# Patient Record
Sex: MALE | Race: OTHER | Employment: OTHER | URBAN - METROPOLITAN AREA
[De-identification: names, ages, dates, MRNs, and addresses within clinical notes are randomized per-mention and may not be internally consistent; named-entity substitution may affect disease eponyms.]

---

## 2024-04-15 ENCOUNTER — OFFICE VISIT (OUTPATIENT)
Age: 54
End: 2024-04-15
Payer: COMMERCIAL

## 2024-04-15 ENCOUNTER — TELEPHONE (OUTPATIENT)
Dept: ADMINISTRATIVE | Facility: OTHER | Age: 54
End: 2024-04-15

## 2024-04-15 VITALS
OXYGEN SATURATION: 95 % | DIASTOLIC BLOOD PRESSURE: 82 MMHG | SYSTOLIC BLOOD PRESSURE: 125 MMHG | BODY MASS INDEX: 17.89 KG/M2 | HEIGHT: 74 IN | WEIGHT: 139.4 LBS | HEART RATE: 88 BPM

## 2024-04-15 DIAGNOSIS — R73.01 IMPAIRED FASTING GLUCOSE: ICD-10-CM

## 2024-04-15 DIAGNOSIS — I10 ESSENTIAL HYPERTENSION: ICD-10-CM

## 2024-04-15 DIAGNOSIS — Z11.4 SCREENING FOR HIV (HUMAN IMMUNODEFICIENCY VIRUS): ICD-10-CM

## 2024-04-15 DIAGNOSIS — I48.0 PAROXYSMAL ATRIAL FIBRILLATION (HCC): ICD-10-CM

## 2024-04-15 DIAGNOSIS — H93.13 TINNITUS OF BOTH EARS: ICD-10-CM

## 2024-04-15 DIAGNOSIS — Z11.59 NEED FOR HEPATITIS C SCREENING TEST: Primary | ICD-10-CM

## 2024-04-15 DIAGNOSIS — M17.0 PRIMARY OSTEOARTHRITIS OF BOTH KNEES: ICD-10-CM

## 2024-04-15 PROBLEM — N28.1 RENAL CYST: Status: ACTIVE | Noted: 2023-11-20

## 2024-04-15 PROBLEM — K21.9 GASTROESOPHAGEAL REFLUX DISEASE: Status: ACTIVE | Noted: 2023-07-07

## 2024-04-15 PROCEDURE — 99214 OFFICE O/P EST MOD 30 MIN: CPT | Performed by: FAMILY MEDICINE

## 2024-04-15 RX ORDER — OMEPRAZOLE 20 MG/1
CAPSULE, DELAYED RELEASE ORAL
COMMUNITY

## 2024-04-15 RX ORDER — AZITHROMYCIN 250 MG/1
TABLET, FILM COATED ORAL
COMMUNITY
Start: 2023-12-15

## 2024-04-15 NOTE — ASSESSMENT & PLAN NOTE
DASH diet (low saturated fat, cholesterol, and total fat; increase fruits and vegetables; fat free or low fat milk or milk products; and increased fiber). Aerobic exercise and limitation of sodium. Weight loss.

## 2024-04-15 NOTE — TELEPHONE ENCOUNTER
----- Message from Avani Landry sent at 4/14/2024  3:12 PM EDT -----  Regarding: Care Gap Request  CRC: Colonoscopy  04/14/24 3:12 PM    Hello, our patient attached above has had CRC: Colonoscopy completed/performed. Please assist in updating the patient chart by pulling the Care Everywhere (CE) document. The date of service is 8/12/2021. Hyperlink to report inside encounter     Thank you,  Avani Landry  [facility] PRIMARY CARE

## 2024-04-15 NOTE — PROGRESS NOTES
Name: Joel Seo      : 1970      MRN: [id number]  Encounter Provider: Len Hernandez MD  Encounter Date: 4/15/2024   Encounter department: [facility] PRIMARY CARE    Assessment & Plan     1. Need for hepatitis C screening test  -     Hepatitis C Antibody; Future  -     Hepatitis C antibody; Future    2. Screening for HIV (human immunodeficiency virus)  -     HIV 1/2 AG/AB w Reflex [facility] for 2 yr old and above; Future  -     HIV 1/2 AB/AG w Reflex [facility] for 2 yr old and above; Future    3. Essential hypertension  Assessment & Plan:  DASH diet (low saturated fat, cholesterol, and total fat; increase fruits and vegetables; fat free or low fat milk or milk products; and increased fiber). Aerobic exercise and limitation of sodium. Weight loss.     Orders:  -     Basic metabolic panel; Future; Expected date: 04/15/2024  -     Lipid panel; Future    4. Paroxysmal atrial fibrillation (HCC)  Assessment & Plan:  Stable. Seen [facility] cardiology.       5. Impaired fasting glucose  Assessment & Plan:  ADA diet, carb counting, low fat, high fiber intake. Water or low calorie drinks. Aerobic exercise. Weight loss       6. Primary osteoarthritis of both knees  Comments:  Warm compresses. Ibuprofen. Consider P.T.    7. Tinnitus of both ears  Comments:  Consider ENT.           Subjective      Denies chest pain, shortness of breath, headache, or visual symptoms. Reviewed and updated PMHx, PSHx, Family Hx, Allergies, and Meds.  No subjective complaints. On no medication. Eating healthy. Starting to exercise. Having knee problems. Teaching martial arts. Doing exercises. Ringing of ears. Right jaw cracks. Seen chiropractor.       Review of Systems   Constitutional:  Negative for fatigue.   HENT:  Positive for tinnitus.    Respiratory:  Negative for shortness of breath.    Cardiovascular:  Negative for chest pain.   Gastrointestinal:  Negative for abdominal pain, constipation and diarrhea.   Genitourinary:   "Negative for dysuria.   Musculoskeletal:  Positive for joint swelling.   Neurological:  Negative for dizziness.       Current Outpatient Medications on File Prior to Visit   Medication Sig   • azithromycin (ZITHROMAX) 250 mg tablet Take by mouth (Patient not taking: Reported on 4/15/2024)   • omeprazole (PriLOSEC) 20 mg delayed release capsule  (Patient not taking: Reported on 4/15/2024)       Objective     /82 (BP Location: Right arm, Patient Position: Sitting, Cuff Size: Standard)   Pulse 88   Ht 6' 2\" (1.88 m)   Wt 63.2 kg (139 lb 6.4 oz)   SpO2 95%   BMI 17.90 kg/m²     Physical Exam  HENT:      Head: Normocephalic.   Eyes:      Conjunctiva/sclera: Conjunctivae normal.   Cardiovascular:      Rate and Rhythm: Normal rate and regular rhythm.   Pulmonary:      Breath sounds: Normal breath sounds.   Abdominal:      General: Abdomen is flat. Bowel sounds are normal.      Palpations: Abdomen is soft.   Musculoskeletal:      Comments: Fullness knees.    Neurological:      General: No focal deficit present.      Mental Status: He is alert.       Len Hernandez MD    "

## 2024-04-15 NOTE — TELEPHONE ENCOUNTER
Upon review of the In Basket request we were able to locate, review, and update the patient chart as requested for CRC: Colonoscopy.    Any additional questions or concerns should be emailed to the Practice Liaisons via the appropriate education email address, please do not reply via In Basket.    Thank you  Sai Green MA

## 2024-05-15 PROBLEM — Z11.59 NEED FOR HEPATITIS C SCREENING TEST: Status: RESOLVED | Noted: 2024-04-15 | Resolved: 2024-05-15

## 2024-05-15 PROBLEM — Z11.4 SCREENING FOR HIV (HUMAN IMMUNODEFICIENCY VIRUS): Status: RESOLVED | Noted: 2024-04-15 | Resolved: 2024-05-15

## 2024-06-03 ENCOUNTER — TELEPHONE (OUTPATIENT)
Age: 54
End: 2024-06-03

## 2024-06-03 DIAGNOSIS — E66.01 CLASS 3 SEVERE OBESITY DUE TO EXCESS CALORIES WITH SERIOUS COMORBIDITY AND BODY MASS INDEX (BMI) OF 40.0 TO 44.9 IN ADULT (HCC): Primary | ICD-10-CM

## 2024-06-03 RX ORDER — SEMAGLUTIDE 0.25 MG/.5ML
INJECTION, SOLUTION SUBCUTANEOUS
Qty: 2 ML | Refills: 0 | Status: SHIPPED | OUTPATIENT
Start: 2024-06-03

## 2024-06-03 NOTE — TELEPHONE ENCOUNTER
Patient requesting prescription for weight loss medication.     Yes hi this is Joel Seo, birth date February 23rd 1970. I've recently had an appointment with Doctor Robyn Ward and I forgot to mention or ask if he could submit to my insurance the weight loss shots. I know that last insurance didn't pay for it. I wanted to see if my current insurance would cover it. I'm going on 340 lbs and I really want to lose some weight. I can't stand it. Want to see if he could submit that to the insurance to see if it gets covered. If you can give me a call back. My cell phone number is [phone] if you need anything from me. Thank you.

## 2024-06-25 DIAGNOSIS — E66.01 CLASS 3 SEVERE OBESITY DUE TO EXCESS CALORIES WITH SERIOUS COMORBIDITY AND BODY MASS INDEX (BMI) OF 40.0 TO 44.9 IN ADULT (HCC): ICD-10-CM

## 2024-06-26 RX ORDER — SEMAGLUTIDE 0.5 MG/.5ML
INJECTION, SOLUTION SUBCUTANEOUS
Qty: 2 ML | Refills: 0 | Status: SHIPPED | OUTPATIENT
Start: 2024-06-26

## 2024-08-16 ENCOUNTER — APPOINTMENT (OUTPATIENT)
Age: 54
End: 2024-08-16
Payer: COMMERCIAL

## 2024-08-16 DIAGNOSIS — I10 ESSENTIAL HYPERTENSION: ICD-10-CM

## 2024-08-16 DIAGNOSIS — Z11.59 NEED FOR HEPATITIS C SCREENING TEST: ICD-10-CM

## 2024-08-16 DIAGNOSIS — Z11.4 SCREENING FOR HIV (HUMAN IMMUNODEFICIENCY VIRUS): ICD-10-CM

## 2024-08-16 LAB
ANION GAP SERPL CALCULATED.3IONS-SCNC: 10 MMOL/L (ref 4–13)
BUN SERPL-MCNC: 14 MG/DL (ref 5–25)
CALCIUM SERPL-MCNC: 8.7 MG/DL (ref 8.4–10.2)
CHLORIDE SERPL-SCNC: 103 MMOL/L (ref 96–108)
CHOLEST SERPL-MCNC: 172 MG/DL
CO2 SERPL-SCNC: 26 MMOL/L (ref 21–32)
CREAT SERPL-MCNC: 0.98 MG/DL (ref 0.6–1.3)
GFR SERPL CREATININE-BSD FRML MDRD: 86 ML/MIN/1.73SQ M
GLUCOSE P FAST SERPL-MCNC: 117 MG/DL (ref 65–99)
HCV AB SER QL: NORMAL
HDLC SERPL-MCNC: 44 MG/DL
HIV 1+2 AB+HIV1 P24 AG SERPL QL IA: NORMAL
HIV 2 AB SERPL QL IA: NORMAL
HIV1 AB SERPL QL IA: NORMAL
HIV1 P24 AG SERPL QL IA: NORMAL
LDLC SERPL CALC-MCNC: 110 MG/DL (ref 0–100)
NONHDLC SERPL-MCNC: 128 MG/DL
POTASSIUM SERPL-SCNC: 4.1 MMOL/L (ref 3.5–5.3)
SODIUM SERPL-SCNC: 139 MMOL/L (ref 135–147)
TRIGL SERPL-MCNC: 91 MG/DL

## 2024-08-16 PROCEDURE — 80061 LIPID PANEL: CPT

## 2024-08-16 PROCEDURE — 87389 HIV-1 AG W/HIV-1&-2 AB AG IA: CPT

## 2024-08-16 PROCEDURE — 86803 HEPATITIS C AB TEST: CPT

## 2024-08-16 PROCEDURE — 80048 BASIC METABOLIC PNL TOTAL CA: CPT

## 2024-08-16 PROCEDURE — 36415 COLL VENOUS BLD VENIPUNCTURE: CPT

## 2024-08-23 ENCOUNTER — OFFICE VISIT (OUTPATIENT)
Age: 54
End: 2024-08-23
Payer: COMMERCIAL

## 2024-08-23 VITALS
SYSTOLIC BLOOD PRESSURE: 122 MMHG | DIASTOLIC BLOOD PRESSURE: 85 MMHG | HEART RATE: 79 BPM | OXYGEN SATURATION: 97 % | BODY MASS INDEX: 40.43 KG/M2 | WEIGHT: 315 LBS | HEIGHT: 74 IN

## 2024-08-23 DIAGNOSIS — I10 ESSENTIAL HYPERTENSION: ICD-10-CM

## 2024-08-23 DIAGNOSIS — R73.01 IMPAIRED FASTING GLUCOSE: ICD-10-CM

## 2024-08-23 DIAGNOSIS — I48.0 PAROXYSMAL ATRIAL FIBRILLATION (HCC): ICD-10-CM

## 2024-08-23 DIAGNOSIS — Z00.00 ANNUAL PHYSICAL EXAM: Primary | ICD-10-CM

## 2024-08-23 DIAGNOSIS — R10.12 LUQ ABDOMINAL PAIN: ICD-10-CM

## 2024-08-23 PROCEDURE — 99396 PREV VISIT EST AGE 40-64: CPT | Performed by: FAMILY MEDICINE

## 2024-08-23 NOTE — PATIENT INSTRUCTIONS
"Patient Education     Routine physical for adults   The Basics   Written by the doctors and editors at [facility]   What is a physical? -- A physical is a routine visit, or \"check-up,\" with your doctor. You might also hear it called a \"wellness visit\" or \"preventive visit.\"  During each visit, the doctor will:   Ask about your physical and mental health   Ask about your habits, behaviors, and lifestyle   Do an exam   Give you vaccines if needed   Talk to you about any medicines you take   Give advice about your health   Answer your questions  Getting regular check-ups is an important part of taking care of your health. It can help your doctor find and treat any problems you have. But it's also important for preventing health problems.  A routine physical is different from a \"sick visit.\" A sick visit is when you see a doctor because of a health concern or problem. Since physicals are scheduled ahead of time, you can think about what you want to ask the doctor.  How often should I get a physical? -- It depends on your age and health. In general, for people age 21 years and older:   If you are younger than 50 years, you might be able to get a physical every 3 years.   If you are 50 years or older, your doctor might recommend a physical every year.  If you have an ongoing health condition, like diabetes or high blood pressure, your doctor will probably want to see you more often.  What happens during a physical? -- In general, each visit will include:   Physical exam - The doctor or nurse will check your height, weight, heart rate, and blood pressure. They will also look at your eyes and ears. They will ask about how you are feeling and whether you have any symptoms that bother you.   Medicines - It's a good idea to bring a list of all the medicines you take to each doctor visit. Your doctor will talk to you about your medicines and answer any questions. Tell them if you are having any side effects that bother you. You " "should also tell them if you are having trouble paying for any of your medicines.   Habits and behaviors - This includes:   Your diet   Your exercise habits   Whether you smoke, drink alcohol, or use drugs   Whether you are sexually active   Whether you feel safe at home  Your doctor will talk to you about things you can do to improve your health and lower your risk of health problems. They will also offer help and support. For example, if you want to quit smoking, they can give you advice and might prescribe medicines. If you want to improve your diet or get more physical activity, they can help you with this, too.   Lab tests, if needed - The tests you get will depend on your age and situation. For example, your doctor might want to check your:   Cholesterol   Blood sugar   Iron level   Vaccines - The recommended vaccines will depend on your age, health, and what vaccines you already had. Vaccines are very important because they can prevent certain serious or deadly infections.   Discussion of screening - \"Screening\" means checking for diseases or other health problems before they cause symptoms. Your doctor can recommend screening based on your age, risk, and preferences. This might include tests to check for:   Cancer, such as breast, prostate, cervical, ovarian, colorectal, prostate, lung, or skin cancer   Sexually transmitted infections, such as chlamydia and gonorrhea   Mental health conditions like depression and anxiety  Your doctor will talk to you about the different types of screening tests. They can help you decide which screenings to have. They can also explain what the results might mean.   Answering questions - The physical is a good time to ask the doctor or nurse questions about your health. If needed, they can refer you to other doctors or specialists, too.  Adults older than 65 years often need other care, too. As you get older, your doctor will talk to you about:   How to prevent falling at " home   Hearing or vision tests   Memory testing   How to take your medicines safely   Making sure that you have the help and support you need at home  All topics are updated as new evidence becomes available and our peer review process is complete.  This topic retrieved from [employer] on: May 02, 2024.  Topic 035206 Version 1.0  Release: 32.4.3 - C32.122  © 2024 [employer] and/or its affiliates. All rights reserved.  Consumer Information Use and Disclaimer   Disclaimer: This generalized information is a limited summary of diagnosis, treatment, and/or medication information. It is not meant to be comprehensive and should be used as a tool to help the user understand and/or assess potential diagnostic and treatment options. It does NOT include all information about conditions, treatments, medications, side effects, or risks that may apply to a specific patient. It is not intended to be medical advice or a substitute for the medical advice, diagnosis, or treatment of a health care provider based on the health care provider's examination and assessment of a patient's specific and unique circumstances. Patients must speak with a health care provider for complete information about their health, medical questions, and treatment options, including any risks or benefits regarding use of medications. This information does not endorse any treatments or medications as safe, effective, or approved for treating a specific patient. Up[employer] Inc[employer] and its affiliates disclaim any warranty or liability relating to this information or the use thereof.The use of this information is governed by the Terms of Use, available at https://www.[employer].com/en/know/clinical-effectiveness-terms. 2024© UpToDate[employer] Inc. and its affiliates and/or licensors. All rights reserved.  Copyright   © 2024 [employer] Inc[employer] and/or its affiliates. All rights reserved.

## 2024-08-23 NOTE — PROGRESS NOTES
Adult Annual Physical  Name: Joel Seo      : 1970      MRN: [id number]  Encounter Provider: Len Hernandez MD  Encounter Date: 2024   Encounter department: [facility] PRIMARY CARE    Assessment & Plan   1. Annual physical exam  2. Essential hypertension  Assessment & Plan:  DASH diet (low saturated fat, cholesterol, and total fat; increase fruits and vegetables; fat free or low fat milk or milk products; and increased fiber). Aerobic exercise and limitation of sodium. Weight loss.   3. Paroxysmal atrial fibrillation (HCC)  Assessment & Plan:  Continue same medications.   4. Impaired fasting glucose  Assessment & Plan:  ADA diet, carb counting, low fat, high fiber intake. Water or low calorie drinks. Aerobic exercise. Weight loss.   Orders:  -     Basic metabolic panel; Future; Expected date: 2024  5. LUQ abdominal pain  Comments:  Continue omeprazole OTC.    Immunizations and preventive care screenings were discussed with patient today. Appropriate education was printed on patient's after visit summary.        Counseling:  Exercise: the importance of regular exercise/physical activity was discussed. Recommend exercise 3-5 times per week for at least 30 minutes.          History of Present Illness     Adult Annual Physical:  Patient presents for annual physical.   Joel Seo is here for annual exam. 4 month follow up, lab results. Complains of having pain on the lLUQ after eating. Denies chest pain, shortness of breath, headache, or visual symptoms. Reviewed and updated PMHx, PSHx, Family Hx, Allergies, and Meds.   Getting hearing test for tinnitus. .     Diet and Physical Activity:  - Diet/Nutrition: consuming 3-5 servings of fruits/vegetables daily, well balanced diet and limited junk food.  - Exercise: no formal exercise.    Depression Screening:  - PHQ-2 Score: 0    General Health:  - Sleep: 4-6 hours of sleep on average.  - Hearing: normal hearing right ear and normal  "hearing left ear.  - Vision: no vision problems.  - Dental: no dental visits for > 1 year.     Health:  - History of STDs: no.   - Urinary symptoms: weak urinary stream.     Advanced Care Planning:  - Has an advanced directive?: no    - Has a durable medical POA?: yes    - ACP document given to patient?: yes      Review of Systems   Constitutional:  Negative for fatigue.   Respiratory:  Negative for shortness of breath.    Cardiovascular:  Negative for chest pain.   Gastrointestinal:  Positive for abdominal pain (LUQ). Negative for constipation, diarrhea and nausea.   Genitourinary:  Negative for dysuria.   Neurological:  Negative for dizziness.         Objective     /85 (BP Location: Left arm, Patient Position: Sitting, Cuff Size: Standard)   Pulse 79   Ht 6' 2\" (1.88 m)   Wt (!) 154 kg (340 lb)   SpO2 97%   BMI 43.65 kg/m²     Physical Exam  Vitals and nursing note reviewed.   Constitutional:       Appearance: He is well-developed.   HENT:      Head: Normocephalic.   Eyes:      Conjunctiva/sclera: Conjunctivae normal.   Cardiovascular:      Rate and Rhythm: Normal rate and regular rhythm.   Pulmonary:      Breath sounds: Normal breath sounds.   Abdominal:      Palpations: Abdomen is soft.   Musculoskeletal:      Cervical back: Neck supple.   Neurological:      Mental Status: He is alert.         "

## 2025-04-15 ENCOUNTER — RX ONLY (RX ONLY)
Age: 55
End: 2025-04-15

## 2025-04-15 ENCOUNTER — DOCTOR'S OFFICE (OUTPATIENT)
Dept: URBAN - METROPOLITAN AREA CLINIC 125 | Facility: CLINIC | Age: 55
Setting detail: OPHTHALMOLOGY
End: 2025-04-15
Payer: COMMERCIAL

## 2025-04-15 DIAGNOSIS — H10.501: ICD-10-CM

## 2025-04-15 DIAGNOSIS — H18.60: ICD-10-CM

## 2025-04-15 DIAGNOSIS — H10.502: ICD-10-CM

## 2025-04-15 PROCEDURE — 99203 OFFICE O/P NEW LOW 30 MIN: CPT | Performed by: OPHTHALMOLOGY

## 2025-04-15 ASSESSMENT — CONFRONTATIONAL VISUAL FIELD TEST (CVF)
OS_FINDINGS: FULL
OD_FINDINGS: FULL

## 2025-04-16 ENCOUNTER — DOCTOR'S OFFICE (OUTPATIENT)
Dept: URBAN - METROPOLITAN AREA CLINIC 125 | Facility: CLINIC | Age: 55
Setting detail: OPHTHALMOLOGY
End: 2025-04-16
Payer: COMMERCIAL

## 2025-04-16 ENCOUNTER — RX ONLY (RX ONLY)
Age: 55
End: 2025-04-16

## 2025-04-16 DIAGNOSIS — H10.501: ICD-10-CM

## 2025-04-16 DIAGNOSIS — H10.502: ICD-10-CM

## 2025-04-16 DIAGNOSIS — H18.60: ICD-10-CM

## 2025-04-16 PROCEDURE — 99213 OFFICE O/P EST LOW 20 MIN: CPT | Performed by: OPHTHALMOLOGY

## 2025-04-16 ASSESSMENT — LID EXAM ASSESSMENTS
OD_BLEPHARITIS: RUL T
OS_BLEPHARITIS: LUL T

## 2025-04-16 ASSESSMENT — CONFRONTATIONAL VISUAL FIELD TEST (CVF)
OS_FINDINGS: FULL
OD_FINDINGS: FULL

## 2025-04-21 PROBLEM — H18.601: Status: ACTIVE | Noted: 2025-04-15

## 2025-04-21 PROBLEM — H18.602: Status: ACTIVE | Noted: 2025-04-15

## 2025-04-21 ASSESSMENT — VISUAL ACUITY
OS_BCVA: 20/40
OD_BCVA: 20/100+1
OD_BCVA: 20/60
OS_BCVA: 20/30-2

## 2025-04-24 ENCOUNTER — DOCTOR'S OFFICE (OUTPATIENT)
Dept: URBAN - METROPOLITAN AREA CLINIC 125 | Facility: CLINIC | Age: 55
Setting detail: OPHTHALMOLOGY
End: 2025-04-24
Payer: COMMERCIAL

## 2025-04-24 DIAGNOSIS — H10.503: ICD-10-CM

## 2025-04-24 DIAGNOSIS — H18.60: ICD-10-CM

## 2025-04-24 PROBLEM — H10.502: Status: ACTIVE | Noted: 2025-04-15

## 2025-04-24 PROBLEM — H10.501: Status: ACTIVE | Noted: 2025-04-15

## 2025-04-24 PROCEDURE — 99213 OFFICE O/P EST LOW 20 MIN: CPT | Performed by: OPHTHALMOLOGY

## 2025-04-24 ASSESSMENT — CONFRONTATIONAL VISUAL FIELD TEST (CVF)
OS_FINDINGS: FULL
OD_FINDINGS: FULL

## 2025-04-24 ASSESSMENT — DRY EYES - PHYSICIAN NOTES
OD_GENERALCOMMENTS: 1+ SPK
OS_GENERALCOMMENTS: 4+ SPK

## 2025-04-24 ASSESSMENT — LID EXAM ASSESSMENTS
OD_BLEPHARITIS: RUL T
OS_BLEPHARITIS: LUL T

## 2025-04-29 PROBLEM — H18.603 KERATOCONUS; BOTH EYES: Status: ACTIVE | Noted: 2025-04-24

## 2025-04-29 ASSESSMENT — KERATOMETRY
OD_AXISANGLE_DEGREES: 121
OS_K1POWER_DIOPTERS: 40.75
OD_K1POWER_DIOPTERS: 42.75
OS_K2POWER_DIOPTERS: 40.75
OD_K2POWER_DIOPTERS: 44.00

## 2025-04-29 ASSESSMENT — VISUAL ACUITY
OD_BCVA: 20/40-2
OS_BCVA: 20/50+2

## 2025-05-08 ENCOUNTER — DOCTOR'S OFFICE (OUTPATIENT)
Dept: URBAN - METROPOLITAN AREA CLINIC 125 | Facility: CLINIC | Age: 55
Setting detail: OPHTHALMOLOGY
End: 2025-05-08
Payer: COMMERCIAL

## 2025-05-08 DIAGNOSIS — H02.61: ICD-10-CM

## 2025-05-08 DIAGNOSIS — H01.004: ICD-10-CM

## 2025-05-08 DIAGNOSIS — H02.64: ICD-10-CM

## 2025-05-08 DIAGNOSIS — H18.603: ICD-10-CM

## 2025-05-08 DIAGNOSIS — H01.001: ICD-10-CM

## 2025-05-08 PROCEDURE — 99213 OFFICE O/P EST LOW 20 MIN: CPT | Performed by: OPHTHALMOLOGY

## 2025-05-08 ASSESSMENT — KERATOMETRY
OS_AXISANGLE_DEGREES: 071
OD_K1POWER_DIOPTERS: 43.00
OS_K1POWER_DIOPTERS: 41.50
OD_AXISANGLE_DEGREES: 117
OD_K2POWER_DIOPTERS: 44.50
OS_K2POWER_DIOPTERS: 42.75

## 2025-05-08 ASSESSMENT — REFRACTION_AUTOREFRACTION
OD_AXIS: 007
OS_CYLINDER: -3.00
OD_SPHERE: -0.75
OD_CYLINDER: -0.75
OS_AXIS: 084
OS_SPHERE: +1.50

## 2025-05-08 ASSESSMENT — VISUAL ACUITY
OS_BCVA: 20/70+2
OD_BCVA: 20/50+1

## 2025-05-08 ASSESSMENT — LID EXAM ASSESSMENTS
OD_BLEPHARITIS: RUL 1+
OS_BLEPHARITIS: LUL 1+

## 2025-05-08 ASSESSMENT — CONFRONTATIONAL VISUAL FIELD TEST (CVF)
OD_FINDINGS: FULL
OS_FINDINGS: FULL

## 2025-07-02 ENCOUNTER — DOCTOR'S OFFICE (OUTPATIENT)
Facility: LOCATION | Age: 55
Setting detail: OPHTHALMOLOGY
End: 2025-07-02
Payer: COMMERCIAL

## 2025-07-02 DIAGNOSIS — H01.004: ICD-10-CM

## 2025-07-02 DIAGNOSIS — H01.001: ICD-10-CM

## 2025-07-02 DIAGNOSIS — H18.603: ICD-10-CM

## 2025-07-02 PROBLEM — H25.13 CATARACT NUCLEAR SCLEROSIS; BOTH EYES: Status: ACTIVE | Noted: 2025-07-02

## 2025-07-02 PROCEDURE — 99214 OFFICE O/P EST MOD 30 MIN: CPT | Performed by: OPHTHALMOLOGY

## 2025-07-02 PROCEDURE — 92025 CPTRIZED CORNEAL TOPOGRAPHY: CPT | Performed by: OPHTHALMOLOGY

## 2025-07-02 ASSESSMENT — KERATOMETRY
OS_K2POWER_DIOPTERS: 42.75
OD_CYLAXISANGLE_DEGREES: 117
OS_K1POWER_DIOPTERS: 41.50
OD_K2POWER_DIOPTERS: 44.50
OS_AXISANGLE_DEGREES: 161
OD_K1POWER_DIOPTERS: 43.00
OS_K1POWER_DIOPTERS: 41.50
OD_AXISANGLE_DEGREES: 27
OS_AXISANGLE2_DEGREES: 071
OS_CYLPOWER_DEGREES: 1.25
OS_K1K2_AVERAGE: 42.125
OD_AXISANGLE_DEGREES: 117
OD_CYLPOWER_DEGREES: 1.5
OS_CYLAXISANGLE_DEGREES: 071
OD_K2POWER_DIOPTERS: 44.50
OD_K1POWER_DIOPTERS: 43.00
OD_K1K2_AVERAGE: 43.75
OS_AXISANGLE_DEGREES: 071
OD_AXISANGLE2_DEGREES: 117
OS_K2POWER_DIOPTERS: 42.75

## 2025-07-02 ASSESSMENT — VISUAL ACUITY
OD_BCVA: 20/70
OS_BCVA: 20/25+2

## 2025-07-02 ASSESSMENT — VASCULARIZATION
OD_VASCULARIZATION: PANNUS
OS_VASCULARIZATION: PANNUS

## 2025-07-02 ASSESSMENT — REFRACTION_MANIFEST
OU_VA: 20/20-2
OD_ADD: +2.00
OD_CYLINDER: -0.75
OS_ADD: +2.00
OS_AXIS: 125
OS_VA1: 20/60
OD_VA1: 20/20-2
OD_SPHERE: -0.50
OS_SPHERE: -0.50
OD_AXIS: 025
OS_CYLINDER: -1.00

## 2025-07-02 ASSESSMENT — TONOMETRY
OS_IOP_MMHG: 10
OD_IOP_MMHG: 10

## 2025-07-02 ASSESSMENT — LID EXAM ASSESSMENTS
OS_BLEPHARITIS: LUL 1+
OD_BLEPHARITIS: RUL 1+

## 2025-07-02 ASSESSMENT — REFRACTION_AUTOREFRACTION
OD_SPHERE: -0.75
OD_CYLINDER: -0.75
OD_AXIS: 007
OS_CYLINDER: -3.00
OS_SPHERE: +1.50
OS_AXIS: 084

## 2025-07-03 ENCOUNTER — TELEPHONE (OUTPATIENT)
Age: 55
End: 2025-07-03

## 2025-07-23 RX ORDER — TOBRAMYCIN AND DEXAMETHASONE 3; 1 MG/ML; MG/ML
1 SUSPENSION/ DROPS OPHTHALMIC
COMMUNITY
Start: 2025-05-28 | End: 2025-07-28 | Stop reason: ALTCHOICE

## 2025-07-23 RX ORDER — DOXYCYCLINE HYCLATE 50 MG/1
2 CAPSULE ORAL 2 TIMES DAILY
COMMUNITY
Start: 2025-05-28 | End: 2025-07-28 | Stop reason: ALTCHOICE

## 2025-07-28 ENCOUNTER — OFFICE VISIT (OUTPATIENT)
Age: 55
End: 2025-07-28
Payer: COMMERCIAL

## 2025-07-28 VITALS
BODY MASS INDEX: 40.43 KG/M2 | OXYGEN SATURATION: 99 % | HEIGHT: 74 IN | WEIGHT: 315 LBS | DIASTOLIC BLOOD PRESSURE: 86 MMHG | SYSTOLIC BLOOD PRESSURE: 122 MMHG | HEART RATE: 74 BPM

## 2025-07-28 DIAGNOSIS — R73.01 IMPAIRED FASTING GLUCOSE: ICD-10-CM

## 2025-07-28 DIAGNOSIS — K21.9 GASTROESOPHAGEAL REFLUX DISEASE WITHOUT ESOPHAGITIS: ICD-10-CM

## 2025-07-28 DIAGNOSIS — I48.0 PAROXYSMAL ATRIAL FIBRILLATION (HCC): ICD-10-CM

## 2025-07-28 DIAGNOSIS — I10 ESSENTIAL HYPERTENSION: Primary | ICD-10-CM

## 2025-07-28 PROCEDURE — 99214 OFFICE O/P EST MOD 30 MIN: CPT | Performed by: FAMILY MEDICINE

## 2025-07-28 RX ORDER — OMEPRAZOLE 20 MG/1
20 CAPSULE, DELAYED RELEASE ORAL
Qty: 90 CAPSULE | Refills: 1 | Status: SHIPPED | OUTPATIENT
Start: 2025-07-28 | End: 2026-01-24